# Patient Record
Sex: MALE | Race: WHITE | NOT HISPANIC OR LATINO | Employment: OTHER | URBAN - METROPOLITAN AREA
[De-identification: names, ages, dates, MRNs, and addresses within clinical notes are randomized per-mention and may not be internally consistent; named-entity substitution may affect disease eponyms.]

---

## 2018-10-15 NOTE — PATIENT DISCUSSION
STRESSED COMPLIANCE, PT WAS OUT OF [facility] FOR 1 WEEK.  PT'S DAUGHTER IS DOING THE DROPS FOR THE PT.

## 2019-10-23 NOTE — PATIENT DISCUSSION
POSSIBLE STEROID RESPONSE. DECREASE PREDNISOLONE QDAY FOR 1 WEEK, THEN STOP. CONTINUE ILEVRO BID OS. PT REFUSES TO SEE RETINA AT THIS TIME.

## 2020-12-29 NOTE — PATIENT DISCUSSION
Question of worsening OD. Pt to use AT's qid and warm compress bid. Put drop in refrigerator so pt can be able to tell when drop has entered eye. normal...

## 2021-02-08 NOTE — PATIENT DISCUSSION
Discussed condition and exacerbating conditions/situations (e.g., dry/arid environments, overhead fans, air conditioners, side effect of medications). Retinal tear and detachment warning symptoms reviewed and patient instructed to call immediately if increasing floaters, flashes, or decreasing peripheral vision.

## 2021-07-29 ENCOUNTER — NEW PATIENT COMPREHENSIVE (OUTPATIENT)
Dept: URBAN - METROPOLITAN AREA CLINIC 46 | Facility: CLINIC | Age: 31
End: 2021-07-29

## 2021-07-29 DIAGNOSIS — Z46.0: ICD-10-CM

## 2021-07-29 DIAGNOSIS — Z01.00: ICD-10-CM

## 2021-07-29 PROCEDURE — 92310-1 LEVEL 1 CONTACT LENS MANAGEMENT

## 2021-07-29 PROCEDURE — 92004 COMPRE OPH EXAM NEW PT 1/>: CPT

## 2021-07-29 PROCEDURE — 92015 DETERMINE REFRACTIVE STATE: CPT

## 2021-07-29 ASSESSMENT — TONOMETRY
OS_IOP_MMHG: 17
OD_IOP_MMHG: 17

## 2021-07-29 ASSESSMENT — VISUAL ACUITY
OD_CC: 20/20
OS_SC: J1+
OS_CC: 20/20
OS_SC: 20/100
OD_CC: J1+
OD_SC: 20/100+1
OS_CC: J1+
OD_SC: J1+

## 2023-01-26 ENCOUNTER — COMPREHENSIVE EXAM (OUTPATIENT)
Dept: URBAN - METROPOLITAN AREA CLINIC 46 | Facility: CLINIC | Age: 33
End: 2023-01-26

## 2023-01-26 DIAGNOSIS — H52.13: ICD-10-CM

## 2023-01-26 DIAGNOSIS — Z46.0: ICD-10-CM

## 2023-01-26 DIAGNOSIS — Z01.00: ICD-10-CM

## 2023-01-26 PROCEDURE — 92310-1 LEVEL 1 CONTACT LENS MANAGEMENT

## 2023-01-26 PROCEDURE — 92014 COMPRE OPH EXAM EST PT 1/>: CPT

## 2023-01-26 PROCEDURE — 92015 DETERMINE REFRACTIVE STATE: CPT

## 2023-01-26 ASSESSMENT — TONOMETRY
OD_IOP_MMHG: 18
OS_IOP_MMHG: 18

## 2023-01-26 ASSESSMENT — VISUAL ACUITY
OD_CC: J1+
OU_CC: J1+
OD_CC: 20/20-1
OS_CC: 20/20-1
OU_CC: 20/20
OS_CC: J1+